# Patient Record
Sex: FEMALE
[De-identification: names, ages, dates, MRNs, and addresses within clinical notes are randomized per-mention and may not be internally consistent; named-entity substitution may affect disease eponyms.]

---

## 2017-01-19 ENCOUNTER — RX ONLY (RX ONLY)
Age: 57
End: 2017-01-19

## 2017-01-19 ENCOUNTER — APPOINTMENT (OUTPATIENT)
Age: 57
Setting detail: DERMATOLOGY
End: 2017-01-19

## 2017-01-19 VITALS — HEIGHT: 64 IN | WEIGHT: 138 LBS

## 2017-01-19 DIAGNOSIS — B35.1 TINEA UNGUIUM: ICD-10-CM

## 2017-01-19 PROBLEM — I10 ESSENTIAL (PRIMARY) HYPERTENSION: Status: ACTIVE | Noted: 2017-01-19

## 2017-01-19 PROCEDURE — OTHER COUNSELING: OTHER

## 2017-01-19 PROCEDURE — OTHER TREATMENT REGIMEN: OTHER

## 2017-01-19 PROCEDURE — OTHER FOLLOW UP FOR NEXT VISIT: OTHER

## 2017-01-19 PROCEDURE — 99202 OFFICE O/P NEW SF 15 MIN: CPT

## 2017-01-19 ASSESSMENT — LOCATION SIMPLE DESCRIPTION DERM
LOCATION SIMPLE: RIGHT SMALL FINGERNAIL
LOCATION SIMPLE: RIGHT RING FINGERNAIL

## 2017-01-19 ASSESSMENT — LOCATION ZONE DERM: LOCATION ZONE: FINGERNAIL

## 2017-01-19 ASSESSMENT — LOCATION DETAILED DESCRIPTION DERM
LOCATION DETAILED: RIGHT SMALL FINGERNAIL
LOCATION DETAILED: RIGHT RING FINGERNAIL

## 2017-01-19 NOTE — PROCEDURE: TREATMENT REGIMEN
Detail Level: Detailed
Samples Given: Kerydin QHS X 3-6 mos. coupon given, call if prescription is needed. If medication is too expensive we may have her come back for a nail sample to prove this is fungal before we recommend oral medication
Plan: Use the topical medication, call if prescription is needed. Follow up if there are any problems

## 2017-03-06 ENCOUNTER — RX ONLY (RX ONLY)
Age: 57
End: 2017-03-06

## 2017-03-06 RX ORDER — TAVABOROLE 43.5 MG/ML
THIN COAT SOLUTION TOPICAL DAILY
Qty: 1 | Refills: 3 | Status: ERX